# Patient Record
Sex: FEMALE | Race: WHITE
[De-identification: names, ages, dates, MRNs, and addresses within clinical notes are randomized per-mention and may not be internally consistent; named-entity substitution may affect disease eponyms.]

---

## 2017-01-03 ENCOUNTER — HOSPITAL ENCOUNTER (OUTPATIENT)
Dept: HOSPITAL 80 - BMCIMAGING | Age: 72
End: 2017-01-03
Attending: GENERAL ACUTE CARE HOSPITAL
Payer: COMMERCIAL

## 2017-01-03 DIAGNOSIS — I73.9: ICD-10-CM

## 2017-01-03 DIAGNOSIS — Z12.31: Primary | ICD-10-CM

## 2017-01-03 PROCEDURE — G0202 SCR MAMMO BI INCL CAD: HCPCS

## 2017-01-03 NOTE — MA
Screening Digital Mammogram

 

Clinical Indications: Routine screening.  

 

Technique:  Standard cephalocaudal and mediolateral oblique projections are obtained.  This examinati
on is processed by the Gek[employer] computer aided detection system. 

 

Comparison: December 2015, December 2014, November 2013, October 2012 , October 2011 and October 2009


 

Breast density: B; There are scattered fibroglandular densities.

 

Findings: CAD was reviewed.  No suspicious findings are identified.

 

Impression: Negative mammogram. . 

 

BI-RADS 1.

 

Recommendation:   Routine screening is recommended in one year.

 

[facility] will send a result letter to the patient.

 

Negative mammography should not preclude additional workup of a clinically suspicious finding.

 

The patient's information is entered into a reminder system with a target due date for her next mammo
gram.

## 2017-01-03 NOTE — US
Ultrasound arterial duplex aortoiliac and right lower extremity runoff

 

Indication:  Peripheral vascular disease.

 

Comparison: 

September 16, 2015 arterial duplex, 

CT angiogram from December 17, 2015.

 

Technique: Grayscale color and duplex imaging of the aortoiliac system and right lower extremity.

 

Findings:  

Velocities in the aorta vary between 73 and 114 cm per second. The common iliac origin has a moderate
 stenosis with elevation of velocity to 226 cm per second. This 2 to 1 peak systolic velocity increas
e is consistent with a 50% stenosis that is consistent with the area stenosis on the CTA as calculate
d by TeraRecon calculations. The waveforms below the 50% stenosis remain multiphasic throughout the i
liac system. External iliac velocity is 80 cm per second. 

 

There is multiphasic waveforms with normal velocities in the common femoral, profunda femoral, superf
icial femoral and popliteal artery. Velocities between the common femoral and popliteal artery vary b
etween 54 and 164 cm per second. Mild diffuse atherosclerotic disease is present without evidence of 
a focal high grade stenosis.

 

Below knee there is multiphasic waveforms in the anterior, tibial, peroneal, and posterior tibial art
eries with velocities varying between 60 to 91 cm per second.

 

Impression:  Approximately 50% stenosis at the iliac origin. Previous ultrasound over estimated the s
tenosis and the stenosis was approximately 50% by volume on the CTA when calculated using TeraRecon i
maging.

## 2017-03-06 ENCOUNTER — HOSPITAL ENCOUNTER (OUTPATIENT)
Dept: HOSPITAL 80 - BMCIMAGING | Age: 72
End: 2017-03-06
Attending: INTERNAL MEDICINE
Payer: COMMERCIAL

## 2017-03-06 DIAGNOSIS — Z13.820: Primary | ICD-10-CM

## 2017-08-14 ENCOUNTER — HOSPITAL ENCOUNTER (OUTPATIENT)
Dept: HOSPITAL 80 - FIMAGING | Age: 72
End: 2017-08-14
Attending: PHYSICIAN ASSISTANT
Payer: COMMERCIAL

## 2017-08-14 DIAGNOSIS — Z01.818: Primary | ICD-10-CM

## 2017-08-14 DIAGNOSIS — M17.11: ICD-10-CM

## 2017-08-14 DIAGNOSIS — Z96.643: ICD-10-CM

## 2017-08-23 ENCOUNTER — HOSPITAL ENCOUNTER (OUTPATIENT)
Dept: HOSPITAL 80 - FCATH | Age: 72
Setting detail: OBSERVATION
LOS: 1 days | Discharge: HOME | End: 2017-08-24
Attending: INTERNAL MEDICINE | Admitting: INTERNAL MEDICINE
Payer: COMMERCIAL

## 2017-08-23 DIAGNOSIS — I25.119: ICD-10-CM

## 2017-08-23 DIAGNOSIS — T82.857A: Primary | ICD-10-CM

## 2017-08-23 DIAGNOSIS — E78.5: ICD-10-CM

## 2017-08-23 DIAGNOSIS — I10: ICD-10-CM

## 2017-08-23 DIAGNOSIS — I73.9: ICD-10-CM

## 2017-08-23 LAB
% IMMATURE GRANULYOCYTES: 0.5 % (ref 0–1.1)
ABSOLUTE IMMATURE GRANULOCYTES: 0.03 10^3/UL (ref 0–0.1)
ABSOLUTE NRBC COUNT: 0 10^3/UL (ref 0–0.01)
ADD DIFF?: NO
ADD MORPH?: NO
ADD SCAN?: NO
ANION GAP SERPL CALC-SCNC: 11 MEQ/L (ref 8–16)
ATYPICAL LYMPHOCYTE FLAG: 20 (ref 0–99)
CALCIUM SERPL-MCNC: 9.2 MG/DL (ref 8.5–10.4)
CHLORIDE SERPL-SCNC: 104 MEQ/L (ref 97–110)
CHOLEST SERPL-MCNC: 147 MG/DL (ref 140–220)
CHOLEST/HDLC SERPL: 1.93 RATIO (ref 1–4.44)
CO2 SERPL-SCNC: 21 MEQ/L (ref 22–31)
CREAT SERPL-MCNC: 0.6 MG/DL (ref 0.6–1)
ERYTHROCYTE [DISTWIDTH] IN BLOOD BY AUTOMATED COUNT: 11.5 % (ref 11.5–15.2)
FRAGMENT RBC FLAG: 0 (ref 0–99)
GFR SERPL CREATININE-BSD FRML MDRD: > 60 ML/MIN/{1.73_M2}
GLUCOSE SERPL-MCNC: 94 MG/DL (ref 70–100)
HCT VFR BLD CALC: 39.4 % (ref 38–47)
HGB BLD-MCNC: 13.9 G/DL (ref 12.6–16.3)
HIGH DENSITY LIPOPROTEIN: 76 MG/DL (ref 40–85)
INR PPP: 1.02 (ref 0.83–1.16)
LDLC/HDLC SERPL: 0.75 RATIO (ref 1–3.22)
LEFT SHIFT FLG: 0 (ref 0–99)
LIPEMIA HEMOLYSIS FLAG: 90 (ref 0–99)
LOW DENSITY LIPOPROTEIN: 57 MG/DL (ref 80–100)
MAGNESIUM SERPL-MCNC: 2.1 MG/DL (ref 1.6–2.3)
MCH RBC BLDCO QN: 34.3 PG (ref 27.9–34.1)
MCHC RBC AUTO-ENTMCNC: 35.3 G/DL (ref 32.4–36.7)
MCV RBC AUTO: 97.3 FL (ref 81.5–99.8)
NON-HIGH DENSITY LIPOPROTEIN: 71 MG/DL (ref 90–129)
NRBC-AUTO%: 0 % (ref 0–0.2)
PLATELET # BLD: 251 10^3/UL (ref 150–400)
PLATELET CLUMPS FLAG: 0 (ref 0–99)
PMV BLD AUTO: 9.4 FL (ref 8.7–11.7)
POTASSIUM SERPL-SCNC: 3.8 MEQ/L (ref 3.5–5.2)
PROTHROMBIN TIME: 13.3 SEC (ref 12–15)
RBC # BLD AUTO: 4.05 10^6/UL (ref 4.18–5.33)
SODIUM SERPL-SCNC: 136 MEQ/L (ref 134–144)
TRIGL SERPL-MCNC: 72 MG/DL (ref 35–135)
VERY LOW DENSITY LIPOPROTEINS: 14 MG/DL (ref 8–25)

## 2017-08-23 PROCEDURE — 93308 TTE F-UP OR LMTD: CPT

## 2017-08-23 PROCEDURE — 4A023N7 MEASUREMENT OF CARDIAC SAMPLING AND PRESSURE, LEFT HEART, PERCUTANEOUS APPROACH: ICD-10-PCS | Performed by: INTERNAL MEDICINE

## 2017-08-23 PROCEDURE — B2151ZZ FLUOROSCOPY OF LEFT HEART USING LOW OSMOLAR CONTRAST: ICD-10-PCS | Performed by: INTERNAL MEDICINE

## 2017-08-23 PROCEDURE — 93454 CORONARY ARTERY ANGIO S&I: CPT

## 2017-08-23 PROCEDURE — G0378 HOSPITAL OBSERVATION PER HR: HCPCS

## 2017-08-23 PROCEDURE — C1887 CATHETER, GUIDING: HCPCS

## 2017-08-23 PROCEDURE — C1769 GUIDE WIRE: HCPCS

## 2017-08-23 PROCEDURE — B2111ZZ FLUOROSCOPY OF MULTIPLE CORONARY ARTERIES USING LOW OSMOLAR CONTRAST: ICD-10-PCS | Performed by: INTERNAL MEDICINE

## 2017-08-23 PROCEDURE — 93005 ELECTROCARDIOGRAM TRACING: CPT

## 2017-08-23 PROCEDURE — C1725 CATH, TRANSLUMIN NON-LASER: HCPCS

## 2017-08-23 NOTE — CPEKG
Heart Rate: 60

RR Interval: 1000

P-R Interval: 188

QRSD Interval: 96

QT Interval: 448

QTC Interval: 448

P Axis: 36

QRS Axis: 7

T Wave Axis: 45

EKG Severity - NORMAL ECG -

EKG Impression: SINUS RHYTHM

Electronically Signed By: Pranav Ching 23-Aug-2017 15:29:06

## 2017-08-23 NOTE — CPIP
[f rep st]



                                                       INVASIVE CARDIAC PROCEDURE





DATE OF PROCEDURE:  08/23/2017



PROCEDURE PERFORMED:  Diagnostic left heart catheterization.



INDICATION FOR PROCEDURE:  Known history of coronary artery disease.  New high-risk findings on exer
cise nuclear stress test.  The patient had 1.5 mm ST-segment depression with exercise.  Talavera treadmi
ll score of -1.5, placing her at moderate risk.  Nuclear imaging demonstrated a large, severe intens
ity reversible defect involving the apical inferior, mid inferior, mid inferolateral, basal inferior
 wall consistent with ischemia.  There was also evidence of transient ischemic dilatation.  These fi
ndings were new compared to previous study done in February 2016.



DESCRIPTION OF PROCEDURE:  After informed consent was obtained, the patient was brought to the [facility] catheterization lab where she was prepped and draped in a sterile fashion.  Prior to considering 
cannulation of the right common femoral artery, images were reviewed from her peripheral interventio
n in 2016.  Images from her diagnostic left heart catheterization were also reviewed in the setting 
of an anomalous circumflex coronary artery.  Based on the images of her peripheral vascular work las
t December, I felt that access through the right common femoral artery would be successful.  Using t
he modified Seldinger technique, a 6-Slovak catheter was placed in the right common femoral artery w
ithout complications.  A JL4 catheter was used to cannulate the left anterior descending coronary ar
dionicio.  Images of the left anterior descending were obtained in multiple projections.  The JL4 cathet
er was exchanged over a guidewire for a JR4 catheter.  JR4 catheter was used to cannulate both the r
ight coronary artery as well as the anomalous circumflex, which shared a common ostium with the RCA.
  Images of both the circumflex and right coronary artery were obtained in multiple projections.



FINDINGS:  

1.  Left anterior descending artery:  Demonstrates some mild luminal irregularities.  No flow-limiti
ng coronary artery disease.

2.  Circumflex:  There is a patent ostial circumflex stent with anomalous takeoff of the circumflex 
arising off the ostium of the RCA.  There are some mild luminal irregularities remaining in the circ
umflex with no evidence of hemodynamically significant stenosis.

3.  The right coronary artery is a large caliber dominant vessel that bifurcates into PDA and PLV.  
There is a 90% stenosis of the proximal right coronary artery.  There is decreased flow throughout t
he remainder of the stented segment of the vessel which is all the way down through the PDA.  There 
is collateral flow from the LAD to the PLV.

4.  Attempts to cross the aortic valve with pigtail catheter were unsuccessful.



CONCLUSIONS:  Severe single-vessel disease in a large caliber dominant right coronary artery.  I hav
e reviewed these images with my interventional colleague, Dr. Aranda.  We will plan for PCI to the p
roximal RCA and reassess the remainder of the vessel after initial percutaneous coronary interventio
n.  The patient confirmed to me that she had returned to Plavix 75 mg daily in conjunction with aspi
rin 81 mg daily.





Job #:  [id number]

## 2017-08-23 NOTE — CPEKG
Heart Rate: 56

RR Interval: 1071

P-R Interval: 204

QRSD Interval: 92

QT Interval: 484

QTC Interval: 468

P Axis: 20

QRS Axis: 18

T Wave Axis: 34

EKG Severity - NORMAL ECG -

EKG Impression: SINUS RHYTHM

Electronically Signed By: Pranav Ching 24-Aug-2017 15:55:19

## 2017-08-23 NOTE — ECHO
[id number]

[id number]



+---------------------------------------------------+      [address]

:                                                   :       [place] [address]

:                                                   :           [phone]

+---------------------------------------------------+       Fax [phone]



                       Adult Echocardiographic Report



+---------------------------------------------------------------------------+

:Name: LUIS ESPINOZA       Study Date: 2017 05:04 PM               :

:MRN: [id number]              Hospital Admission Number: [id number]       :

:: 1945              Gender: Female                                :

:Age: 72 yrs                  Race: WH                                      :

:Reason For Study: Eval for Pericardial Effusion                            :

:History: Cath                                                              :

+---------------------------------------------------------------------------+





Pericardium/Pleural

There is no pericardial effusion.





Conclusion

This is a limited echo x 2 to evaluate left ventricular systolic function

and pericardial space.

1. This is a limited echo x 2 to evaluate left ventricular systolic function

and pericardial space.

2. The left ventricle is normal in size and function.

3. There is no pericardial effusion.



_____________________________________________________________________________







Final Reading Physician:

                        Man Aranda MD  electronically signed on 2017

                        05:23 PM

Ordering Physician: Franky Singh

Performed By: Law Mccord, CS

## 2017-08-24 VITALS
TEMPERATURE: 97.5 F | DIASTOLIC BLOOD PRESSURE: 63 MMHG | SYSTOLIC BLOOD PRESSURE: 124 MMHG | RESPIRATION RATE: 14 BRPM | HEART RATE: 59 BPM | OXYGEN SATURATION: 94 %

## 2017-08-24 LAB
% IMMATURE GRANULYOCYTES: 0.3 % (ref 0–1.1)
ABSOLUTE IMMATURE GRANULOCYTES: 0.02 10^3/UL (ref 0–0.1)
ABSOLUTE NRBC COUNT: 0 10^3/UL (ref 0–0.01)
ADD DIFF?: NO
ADD MORPH?: NO
ADD SCAN?: NO
ALBUMIN SERPL-MCNC: 3.6 G/DL (ref 3.5–5)
ALP SERPL-CCNC: 64 IU/L (ref 38–126)
ALT SERPL-CCNC: 27 IU/L (ref 9–52)
ANION GAP SERPL CALC-SCNC: 11 MEQ/L (ref 8–16)
AST SERPL-CCNC: 22 IU/L (ref 14–46)
ATYPICAL LYMPHOCYTE FLAG: 10 (ref 0–99)
BILIRUB SERPL-MCNC: 1.1 MG/DL (ref 0.1–1.4)
CALCIUM SERPL-MCNC: 8.7 MG/DL (ref 8.5–10.4)
CHLORIDE SERPL-SCNC: 103 MEQ/L (ref 97–110)
CO2 SERPL-SCNC: 22 MEQ/L (ref 22–31)
CREAT SERPL-MCNC: 0.5 MG/DL (ref 0.6–1)
ERYTHROCYTE [DISTWIDTH] IN BLOOD BY AUTOMATED COUNT: 11.5 % (ref 11.5–15.2)
FRAGMENT RBC FLAG: 0 (ref 0–99)
GFR SERPL CREATININE-BSD FRML MDRD: > 60 ML/MIN/{1.73_M2}
GLUCOSE SERPL-MCNC: 79 MG/DL (ref 70–100)
HCT VFR BLD CALC: 36.7 % (ref 38–47)
HGB BLD-MCNC: 12.8 G/DL (ref 12.6–16.3)
LEFT SHIFT FLG: 0 (ref 0–99)
LIPEMIA HEMOLYSIS FLAG: 90 (ref 0–99)
MCH RBC BLDCO QN: 34.1 PG (ref 27.9–34.1)
MCHC RBC AUTO-ENTMCNC: 34.9 G/DL (ref 32.4–36.7)
MCV RBC AUTO: 97.9 FL (ref 81.5–99.8)
NRBC-AUTO%: 0 % (ref 0–0.2)
PLATELET # BLD: 223 10^3/UL (ref 150–400)
PLATELET CLUMPS FLAG: 0 (ref 0–99)
PMV BLD AUTO: 9.7 FL (ref 8.7–11.7)
POTASSIUM SERPL-SCNC: 3.7 MEQ/L (ref 3.5–5.2)
PROT SERPL-MCNC: 6.1 G/DL (ref 6.3–8.2)
RBC # BLD AUTO: 3.75 10^6/UL (ref 4.18–5.33)
SODIUM SERPL-SCNC: 136 MEQ/L (ref 134–144)

## 2017-08-24 NOTE — CPEKG
Heart Rate: 64

RR Interval: 938

P-R Interval: 220

QRSD Interval: 96

QT Interval: 440

QTC Interval: 454

P Axis: 30

QRS Axis: 10

T Wave Axis: 18

EKG Severity - ABNORMAL ECG -

EKG Impression: SINUS RHYTHM

EKG Impression: FIRST DEGREE AV BLOCK

Electronically Signed By: Pranav Ching 24-Aug-2017 15:55:06

## 2017-08-24 NOTE — GDS
[f rep st]



                                                             DISCHARGE SUMMARY





DISCHARGE DIAGNOSES:  

1.  Coronary artery disease:  Patient has known coronary artery disease and is status post previous 
percutaneous coronary intervention of her right coronary artery and circumflex coronary artery.  She
 presented with symptoms of class 2 angina and a high risk abnormal nuclear stress test.  She was ta
zaira to the cardiac catheterization laboratory for risk stratification where she was found to have si
ngle-vessel coronary artery disease involving the right coronary artery.  Patient had an approximate
ly 75% stenosis in the proximal segment of the right coronary artery, representing in-stent restenos
is.  Patient also had a 95% stenosis in the distal right coronary artery, also representing in-stent
 restenosis.  Percutaneous coronary intervention of the right coronary artery was attempted but was 
halted secondary to sub intimal dissection with inability to re-enter the right coronary artery.  Th
e patient was admitted to the hospital for observation overnight.  Her postprocedural course was unc
omplicated.  She denied symptoms of chest pain.  Followup echocardiogram demonstrated no evidence of
 pericardial effusion or segmental wall motion abnormality.  Patient will be presented at [facility] for revascularization and management strategies.

2.  Hypertension:  Patient has a long history of hypertension.  Her blood pressure has been well con
trolled on metoprolol and valsartan.  Patient will be continued on these medications.

3.  Hyperlipidemia:  Patient has a long history of hyperlipidemia.  Her LDL cholesterol is 57, on Li
pitor 40 mg daily.  Patient will be continued on her current medications.



SUMMARY OF PRESENTATION AND COURSE:  The patient is a 72-year-old female with known coronary artery 
disease, status post previous stenting of her right coronary artery and circumflex coronary artery. 
 She presented in the outpatient setting with symptoms of class II angina.  Stress testing was notab
le for a large inferolateral perfusion defect.  She was taken to the cardiac catheterization [facility] in 08/23/2017 for further risk stratification.  Coronary angiography demonstrated single-vessel 
coronary artery disease involving her right coronary artery.  She was found to have a high-grade pro
ximal stenosis of approximately 75%, as well as a high-grade distal stenosis of approximately 95%; b
oth of these lesions represented in-stent restenosis of the previously placed stents.  Percutaneous 
coronary intervention of the right coronary artery was attempted but was halted secondary to subinti
mal dissection in the distal vessel with failure of re-entry into the true lumen.  Patient was admit
jd to the hospital overnight for observation.  Her postprocedural course was uncomplicated.  Feli kahn denied symptoms of angina throughout her hospitalization.  Echocardiogram demonstrated no evidence
 of pericardial effusion, and at the time of discharge, patient was ambulating without difficulty.



DISCHARGE MEDICATIONS:  Please see medicine reconciliation form.



PHYSICAL EXAMINATION AT DISCHARGE:  GENERAL:  Patient is resting comfortably in bed.  She did not ap
pear to be in acute distress.  VITAL SIGNS:  Temperature afebrile, pulse 66, blood pressure 112/71, 
respiratory rate 18, SaO2 95% on room air.  LUNGS:  Clear to auscultation bilaterally.  CARDIOVASCUL
AR:  Regular rate and rhythm.  S1, S2.  No murmurs, rubs, or gallops appreciated.  ABDOMEN:  Soft, n
ontender.  Normoactive bowel sounds.  EXTREMITIES:  No clubbing, cyanosis, or edema.  Right inguinal
 access site with no hematoma or ecchymosis.  2+ dorsalis pedis pulse.



LABORATORY AT DISCHARGE:  Sodium 136, potassium 3.7, chloride 103, CO2 22, BUN 13, creatinine 0.5.  
White blood cell count 5.84, hemoglobin 12.8, hematocrit 36.7, platelet count 223.



ARRANGEMENTS FOR FOLLOWUP CARE:  

1.  Patient will follow up with Dr. Singh of [facility] in approximately 1-2 weeks' perio
d of time.

2.  Patient will be presented at case conference for revascularization strategies as well as medical
 management strategies.



INSTRUCTIONS TO PATIENT:  

1.  Standard groin precautions given.

2.  Patient to return to the hospital for crescendo anginal symptoms.





Job #:  [id number]

## 2017-08-24 NOTE — ECHO
[id number]

[id number]



+---------------------------------------------------+      [address]

:                                                   :       [place] [address]

:                                                   :           [phone]

+---------------------------------------------------+       Fax [phone]



                       Adult Echocardiographic Report



+----------------------------------------------------------------------------

------+

:Name: LUIS ESPINOZA KStudy Date: 2017 12:24 PM                       

      :

:MRN: [id number]       Hospital Admission Number: [id number] Locatio

n: 204:

:: 1945       Gender: Female                         Height: 63 in  

      :

:Age: 72 yrs           Race: WH                               Weight: 154 lb 

      :

:Reason For Study: eval for pericardial effusion                             

      :

:                                                             BSA: 1.7 meters

2     :

:History: CATH                                                               

      :

+----------------------------------------------------------------------------

------+





Pericardium/Pleural

No significant pericardial effusion noted.



Conclusion

Limited echocardiogram to reassess for pericardial effusion.

No pericardial effusion present.





_____________________________________________________________________________





Final Reading Physician:

                        Adore Goldstein signed on 2017 02:29 PM

Ordering Physician: Franky Singh

Performed By: Lucy Morillo

## 2017-11-10 ENCOUNTER — HOSPITAL ENCOUNTER (INPATIENT)
Dept: HOSPITAL 80 - F2W | Age: 72
LOS: 4 days | Discharge: HOME | DRG: 236 | End: 2017-11-14
Attending: THORACIC SURGERY (CARDIOTHORACIC VASCULAR SURGERY) | Admitting: THORACIC SURGERY (CARDIOTHORACIC VASCULAR SURGERY)
Payer: COMMERCIAL

## 2017-11-10 DIAGNOSIS — I10: ICD-10-CM

## 2017-11-10 DIAGNOSIS — Z95.5: ICD-10-CM

## 2017-11-10 DIAGNOSIS — D62: ICD-10-CM

## 2017-11-10 DIAGNOSIS — I25.10: Primary | ICD-10-CM

## 2017-11-10 DIAGNOSIS — I73.9: ICD-10-CM

## 2017-11-10 DIAGNOSIS — Z96.643: ICD-10-CM

## 2017-11-10 LAB
CALCULATED OXYGEN SATURATION: 98 % (ref 92–95)
SAMPLE TYPE: (no result)

## 2017-11-10 PROCEDURE — 5A1221Z PERFORMANCE OF CARDIAC OUTPUT, CONTINUOUS: ICD-10-PCS | Performed by: THORACIC SURGERY (CARDIOTHORACIC VASCULAR SURGERY)

## 2017-11-10 PROCEDURE — P9041 ALBUMIN (HUMAN),5%, 50ML: HCPCS

## 2017-11-10 PROCEDURE — 06BQ4ZZ EXCISION OF LEFT SAPHENOUS VEIN, PERCUTANEOUS ENDOSCOPIC APPROACH: ICD-10-PCS | Performed by: THORACIC SURGERY (CARDIOTHORACIC VASCULAR SURGERY)

## 2017-11-10 PROCEDURE — 30233N1 TRANSFUSION OF NONAUTOLOGOUS RED BLOOD CELLS INTO PERIPHERAL VEIN, PERCUTANEOUS APPROACH: ICD-10-PCS | Performed by: THORACIC SURGERY (CARDIOTHORACIC VASCULAR SURGERY)

## 2017-11-10 PROCEDURE — 02100Z9 BYPASS CORONARY ARTERY, ONE ARTERY FROM LEFT INTERNAL MAMMARY, OPEN APPROACH: ICD-10-PCS | Performed by: THORACIC SURGERY (CARDIOTHORACIC VASCULAR SURGERY)

## 2017-11-10 PROCEDURE — 021109W BYPASS CORONARY ARTERY, TWO ARTERIES FROM AORTA WITH AUTOLOGOUS VENOUS TISSUE, OPEN APPROACH: ICD-10-PCS | Performed by: THORACIC SURGERY (CARDIOTHORACIC VASCULAR SURGERY)

## 2017-11-10 PROCEDURE — 02L70CK OCCLUSION OF LEFT ATRIAL APPENDAGE WITH EXTRALUMINAL DEVICE, OPEN APPROACH: ICD-10-PCS | Performed by: THORACIC SURGERY (CARDIOTHORACIC VASCULAR SURGERY)

## 2017-11-10 RX ADMIN — ALBUMIN (HUMAN) PRN MLS: 2.5 SOLUTION INTRAVENOUS at 11:28

## 2017-11-10 RX ADMIN — Medication PRN MCG: at 14:00

## 2017-11-10 RX ADMIN — ALBUMIN (HUMAN) PRN MLS: 2.5 SOLUTION INTRAVENOUS at 14:10

## 2017-11-10 RX ADMIN — POTASSIUM CHLORIDE SCH MLS: 200 INJECTION, SOLUTION INTRAVENOUS at 18:23

## 2017-11-10 RX ADMIN — POTASSIUM CHLORIDE SCH MLS: 200 INJECTION, SOLUTION INTRAVENOUS at 15:30

## 2017-11-10 RX ADMIN — ALBUMIN (HUMAN) PRN MLS: 2.5 SOLUTION INTRAVENOUS at 13:24

## 2017-11-10 RX ADMIN — HYDROCODONE BITARTRATE AND ACETAMINOPHEN PRN TAB: 5; 325 TABLET ORAL at 19:08

## 2017-11-10 RX ADMIN — Medication PRN MCG: at 11:27

## 2017-11-10 RX ADMIN — DOCUSATE SODIUM AND SENNOSIDES SCH: 50; 8.6 TABLET ORAL at 20:19

## 2017-11-10 RX ADMIN — Medication PRN MCG: at 20:02

## 2017-11-10 RX ADMIN — Medication SCH MLS: at 21:50

## 2017-11-10 RX ADMIN — MUPIROCIN SCH APP: 20 OINTMENT TOPICAL at 21:50

## 2017-11-10 RX ADMIN — Medication PRN MCG: at 23:14

## 2017-11-10 RX ADMIN — Medication SCH MLS: at 14:25

## 2017-11-10 NOTE — PDANEPAE
ANE History of Present Illness





cab





ANE Past Medical History





- Cardiovascular History


Hx Hypertension: Yes


Hx Arrhythmias: No


Hx Chest Pain: No


Hx Coronary Artery / Peripheral Vascular Disease: Yes


Hx CHF / Valvular Disease: No


Hx Palpitations: No


Cardiovascular History Comment: STENTS PLACED X9 LAST 10/25/2017





- Pulmonary History


Hx COPD: No


Hx Asthma/Reactive Airway Disease: No


Hx Recent Upper Respiratory Infection: No


Hx Oxygen in Use at Home: No


Hx Sleep Apnea: No


Sleep Apnea Screening Result - Last Documented: Negative





- Neurologic History


Hx Cerebrovascular Accident: No


Hx Seizures: No


Hx Dementia: No





- Endocrine History


Hx Diabetes: No





- Renal History


Hx Renal Disorders: Yes


Renal History Comment: OVERACTIVE BLADDER





- Liver History


Hx Hepatic Disorders: No





- Neurological & Psychiatric Hx


Hx Neurological and Psychiatric Disorders: No





- Cancer History


Hx Cancer: No





- Congenital Disorder History


Hx Congenital Disorders: No





- GI History


Hx Gastrointestinal Disorders: No





- Other Health History


Other Health History: OSTEOARTHRITIS BAD KNEE'S.  HEREDITARY HEMOCHROMATOSIS.  

BRUISES EASILY





- Chronic Pain History


Chronic Pain: Yes (KNEE'S)





- Surgical History


Prior Surgeries: HEART STENT 10/25/2017.  JUWAN TOTAL HIP.  SHLDR RTC.  

TONSILLECTOMY





ANE Review of Systems


Review of Systems: 








- Exercise capacity


METS (RN): 4 METS





ANE Patient History





- Allergies


Allergies/Adverse Reactions: 








amoxicillin [Amoxicillin] Allergy (Severe, Verified 08/21/17 11:27)


 Rash


ampicillin [Ampicillin] Allergy (Severe, Verified 08/21/17 11:27)


 Rash


Penicillins Allergy (Severe, Verified 11/09/17 17:47)


 Rash


amlodipine besylate [From Norvasc] Allergy (Mild, Verified 11/09/17 17:34)


 Other-Enter Comments


felodipine [Felodipine] Allergy (Mild, Verified 11/09/17 17:34)


 Other-Enter Comments








- Home Medications


Home Medications: 








Aspirin [Aspirin 81mg (*)] 81 mg PO HS 08/04/14 [Last Taken 11/09/17 21:30]


Atorvastatin Calcium [Lipitor 40 mg (*)] 40 mg PO HS 06/22/16 [Last Taken 11/09/ 17 21:30]


Cholecalciferol Vit D3 [Vitamin D3] 400 units PO DAILY 11/09/17 [Last Taken 11/ 09/17 06:30]


Herbals/Supplements -Info Only 1 ea PO DAILY 11/09/17 [Last Taken 11/09/17 06:30

]


amLODIPine/VALSARTAN [Amlodipine-Valsartan  mg] 1 each PO DAILY 11/09/17 [

Last Taken 11/09/17 06:30]








- NPO status


NPO Since - Liquids (Date): 11/09/17


NPO Since - Liquids (Time): 23:50


NPO Since - Solids (Date): 11/09/17


NPO Since - Solids (Time): 18:00





- Anes Hx


Anes Hx: no prior problems





- Smoking Hx


Smoking Status: Former smoker





ANE Labs/Vital Signs





- Vital Signs


Blood Pressure: 129/73


Heart Rate: 66


Respiratory Rate: 16


O2 Sat (%): 96


Height: 161.29 cm


Weight: 68.039 kg





ANE Physical Exam





- Airway


Mallampati Score: Class 2


Mouth exam: normal dental/mouth exam





- Pulmonary


Pulmonary: no respiratory distress





- Cardiovascular


Cardiovascular: regular rate and rhythym





- ASA Status


ASA Status: III





ANE Anesthesia Plan


Anesthesia Plan: general endotracheal anesthesia


Lines/Monitors: arterial line, central line, JENNYFER

## 2017-11-10 NOTE — CPEKG
Heart Rate: 68

RR Interval: 882

P-R Interval: 216

QRSD Interval: 88

QT Interval: 468

QTC Interval: 498

P Axis: 64

QRS Axis: 1

T Wave Axis: 26

EKG Severity - BORDERLINE ECG -

EKG Impression: SINUS RHYTHM

EKG Impression: BORDERLINE PROLONGED QT INTERVAL

Electronically Signed By: Zane Vallejo 10-Nov-2017 15:53:30

## 2017-11-10 NOTE — GOP
[f rep st]



                                                                OPERATIVE REPORT





DATE OF OPERATION:  11/10/2017



SURGEON:  Jorge Welch DO



ASSISTANT:  Ananth Sapp PA-C.



ANESTHESIA:  Tej Zee MD.



PREOPERATIVE DIAGNOSIS:  Arteriosclerotic heart disease.



POSTOPERATIVE DIAGNOSIS:  Arteriosclerotic heart disease.



PROCEDURE PERFORMED:  

1.  Coronary artery bypass grafting x3 with left internal mammary artery to the mid left anterior sheela
cending, saphenous vein graft to the patent ductus arteriosus, sequential posterior lateral right.

2.  AtriClip to the left atrial appendage.

3.  Endoscopic vein harvest of the left thigh.



FINDINGS:  





DESCRIPTION OF PROCEDURE:  Patient was consented for surgery, brought to the operating room, intubate
d, and monitoring lines were placed.  She was prepped and draped in sterile classical manner.  Sterno
deneen was performed.  She was noted to be severely osteoporotic.  Mammary was harvested; it was a 2 mm
 vessel with brisk flow.  She was heparinized.  Cannulated ascending aorta and right atrium in standa
rd fashion without difficulty.  Cardiopulmonary bypass was begun.  A cardioplegic arrest was obtained
 with antegrade cardioplegia, topical hypothermia, and systemic cooling.  



Initially, a 35 mm AtriClip was applied to the left atrial appendage flush with the left atrial wall.
  We then proceeded with grafting the posterolateral branch of the right, which was heavily stented o
ut to its midportion.  It was a 1.6-1.8 mm vessel and was grafted end-to-side with the vein graft.  T
his was then brought off the PDA side-to-side, which was a 1.6 mm vessel in its proximal portion, and
 fusion flow was good without resistance.  It was then anastomosed to the ascending aorta.  Rewarming
 was begun while the mammary was grafted to a poor quality LAD, which, upon opening in the midportion
, appeared to have a healed dissection.  There was circumferential plaque throughout most of the vess
el.  The mammary was grafted.  It was probed patent.  It was tacked to the epicardium.  Cross-clamp w
as removed with suction on the ascending aortic vent.  It should be noted that vein was harvested fro
m the left thigh by TRACEE Ornelas, who first assisted throughout the procedure.  Spontaneous cardia
c activity was noted to resume.  The patient was rewarmed and weaned from bypass.  Heparin was revers
ed with protamine.  Cannula was removed and oversewn.  Two ventricular pacing wires, one left pleural
, and one mediastinal drains were placed.  Thymic fat and pericardium were closed.  Chest was closed 
in standard fashion.  The patient was returned to ICU in stable condition.





Job #:  [id number]

## 2017-11-11 LAB
% IMMATURE GRANULYOCYTES: 0.5 % (ref 0–1.1)
ABSOLUTE IMMATURE GRANULOCYTES: 0.06 10^3/UL (ref 0–0.1)
ABSOLUTE NRBC COUNT: 0 10^3/UL (ref 0–0.01)
ADD DIFF?: NO
ADD MORPH?: NO
ADD SCAN?: NO
ANION GAP SERPL CALC-SCNC: 9 MEQ/L (ref 8–16)
ATYPICAL LYMPHOCYTE FLAG: 0 (ref 0–99)
CALCIUM SERPL-MCNC: 8 MG/DL (ref 8.5–10.4)
CHLORIDE SERPL-SCNC: 105 MEQ/L (ref 97–110)
CO2 SERPL-SCNC: 23 MEQ/L (ref 22–31)
CREAT SERPL-MCNC: 0.6 MG/DL (ref 0.6–1)
ERYTHROCYTE [DISTWIDTH] IN BLOOD BY AUTOMATED COUNT: 13.1 % (ref 11.5–15.2)
ERYTHROCYTE [DISTWIDTH] IN BLOOD BY AUTOMATED COUNT: 13.2 % (ref 11.5–15.2)
FRAGMENT RBC FLAG: 0 (ref 0–99)
GFR SERPL CREATININE-BSD FRML MDRD: > 60 ML/MIN/{1.73_M2}
GLUCOSE SERPL-MCNC: 118 MG/DL (ref 70–100)
HCT VFR BLD CALC: 23.2 % (ref 38–47)
HCT VFR BLD CALC: 26.3 % (ref 38–47)
HGB BLD-MCNC: 8.3 G/DL (ref 12.6–16.3)
HGB BLD-MCNC: 8.8 G/DL (ref 12.6–16.3)
LEFT SHIFT FLG: 0 (ref 0–99)
LIPEMIA HEMOLYSIS FLAG: 80 (ref 0–99)
LIPEMIA HEMOLYSIS FLAG: 90 (ref 0–99)
MCH RBC BLDCO QN: 34.2 PG (ref 27.9–34.1)
MCH RBC BLDCO QN: 36.6 PG (ref 27.9–34.1)
MCHC RBC AUTO-ENTMCNC: 33.5 G/DL (ref 32.4–36.7)
MCHC RBC AUTO-ENTMCNC: 35.8 G/DL (ref 32.4–36.7)
MCV RBC AUTO: 102.2 FL (ref 81.5–99.8)
MCV RBC AUTO: 102.3 FL (ref 81.5–99.8)
NRBC-AUTO%: 0 % (ref 0–0.2)
PLATELET # BLD: 116 10^3/UL (ref 150–400)
PLATELET # BLD: 116 10^3/UL (ref 150–400)
PLATELET CLUMPS FLAG: 0 (ref 0–99)
PLATELET CLUMPS FLAG: 50 (ref 0–99)
PMV BLD AUTO: 10 FL (ref 8.7–11.7)
POTASSIUM SERPL-SCNC: 4.4 MEQ/L (ref 3.5–5.2)
POTASSIUM SERPL-SCNC: 4.5 MEQ/L (ref 3.5–5.2)
POTASSIUM SERPL-SCNC: 4.6 MEQ/L (ref 3.5–5.2)
RBC # BLD AUTO: 2.27 10^6/UL (ref 4.18–5.33)
RBC # BLD AUTO: 2.57 10^6/UL (ref 4.18–5.33)
SODIUM SERPL-SCNC: 137 MEQ/L (ref 134–144)

## 2017-11-11 RX ADMIN — PANTOPRAZOLE SODIUM SCH MG: 40 TABLET, DELAYED RELEASE ORAL at 09:43

## 2017-11-11 RX ADMIN — DOCUSATE SODIUM AND SENNOSIDES SCH TAB: 50; 8.6 TABLET ORAL at 20:24

## 2017-11-11 RX ADMIN — HYDROCODONE BITARTRATE AND ACETAMINOPHEN PRN TAB: 5; 325 TABLET ORAL at 15:21

## 2017-11-11 RX ADMIN — Medication SCH MLS: at 14:56

## 2017-11-11 RX ADMIN — MUPIROCIN SCH APP: 20 OINTMENT TOPICAL at 21:00

## 2017-11-11 RX ADMIN — HYDROCODONE BITARTRATE AND ACETAMINOPHEN PRN TAB: 5; 325 TABLET ORAL at 20:21

## 2017-11-11 RX ADMIN — HEPARIN SODIUM SCH UNIT: 5000 INJECTION, SOLUTION INTRAVENOUS; SUBCUTANEOUS at 14:56

## 2017-11-11 RX ADMIN — Medication SCH MLS: at 05:26

## 2017-11-11 RX ADMIN — Medication SCH MLS: at 21:32

## 2017-11-11 RX ADMIN — HEPARIN SODIUM SCH UNIT: 5000 INJECTION, SOLUTION INTRAVENOUS; SUBCUTANEOUS at 05:27

## 2017-11-11 RX ADMIN — ASPIRIN 81 MG SCH MG: 81 TABLET ORAL at 20:23

## 2017-11-11 RX ADMIN — HEPARIN SODIUM SCH UNIT: 5000 INJECTION, SOLUTION INTRAVENOUS; SUBCUTANEOUS at 21:34

## 2017-11-11 RX ADMIN — HYDROCODONE BITARTRATE AND ACETAMINOPHEN PRN TAB: 5; 325 TABLET ORAL at 00:12

## 2017-11-11 RX ADMIN — HYDROCODONE BITARTRATE AND ACETAMINOPHEN PRN TAB: 5; 325 TABLET ORAL at 10:45

## 2017-11-11 RX ADMIN — DOCUSATE SODIUM AND SENNOSIDES SCH: 50; 8.6 TABLET ORAL at 09:44

## 2017-11-11 RX ADMIN — MUPIROCIN SCH APP: 20 OINTMENT TOPICAL at 09:43

## 2017-11-11 NOTE — ASMTCASEMG
Living Arrangements

 

What is your living           Answers:  With Spouse                           

arrangement? Who do you                                                       

live with?                                                                    

Type Of Residence

 

What kind of residence do     Answers:  House                                 

you live in?                                                                  

Discharge Plan Comments

 

Coordination Status           

Comments                      

Notes:

 Pt is a 73 y/o female admitted for CAD. Pt is post op day 1 for a CABG x3. PT is recommending home 


independent. OT has been ordered and awaiting recommendation. Pt will most likely discharge w/out 

any needs and have outpatient rehab when medically stable. CM available for d/c needs.

 

Date Signed:  11/11/2017 10:45 AM

Electronically Signed By:OLENA Cook

## 2017-11-11 NOTE — SOAPPROG
SOAP Progress Note


Assessment/Plan: 


CABGx3 (LIMA-LAD, seq SVG-PL/PD), AtriClip JOSEFINA, EVH left thigh





CAD s/p CABGx3


- BB, ASA, statin when appropriate 


- AL/FC out


- CT to water seal (?air leak)


- SCDs/heparin SQ for SVT prophylaxis 


- Transfer PCU





Acute blood loss anemia 


- Stable without the need for blood product transfusions 








Subjective: 


Pain well-controlled, denies N/V/SOB/abd pain.


Objective: 





 Vital Signs











Temp Pulse Resp BP Pulse Ox


 


 37.5 C   89   21 H  100/53 L  93 


 


 11/11/17 04:00  11/11/17 06:00  11/11/17 06:00  11/11/17 06:00  11/11/17 06:00








 Laboratory Results





 11/11/17 04:00 





 11/11/17 04:00 





 











 11/10/17 11/11/17 11/12/17





 05:59 05:59 05:59


 


Intake Total  1362 


 


Output Total  2125 


 


Balance  -763 














Physical Exam





- Physical Exam


General Appearance: WD/WN, alert, no apparent distress


EENT: No scleral icterus (R), No scleral icterus (L)


Neck: normal inspection


Respiratory: No respiratory distress


Cardiac/Chest: regular rate, rhythm


Abdomen: non-tender, soft, No distended


Skin: normal color, warm/dry


Extremities: pedal edema


Neuro/Psych: no motor/sensory deficits, alert, normal mood/affect, oriented x 3





ICD10 Worksheet


Patient Problems: 


 Problems











Problem Status Onset


 


S/P CABG x 3 Acute  


 


Coronary arteriosclerosis Chronic  


 


Edema Acute  


 


Hypokalemia Acute  


 


Hyponatremia Acute

## 2017-11-12 LAB
% IMMATURE GRANULYOCYTES: 0.5 % (ref 0–1.1)
ABSOLUTE IMMATURE GRANULOCYTES: 0.08 10^3/UL (ref 0–0.1)
ABSOLUTE NRBC COUNT: 0 10^3/UL (ref 0–0.01)
ADD DIFF?: NO
ADD MORPH?: NO
ADD SCAN?: NO
ANION GAP SERPL CALC-SCNC: 9 MEQ/L (ref 8–16)
ATYPICAL LYMPHOCYTE FLAG: 0 (ref 0–99)
CALCIUM SERPL-MCNC: 7.9 MG/DL (ref 8.5–10.4)
CHLORIDE SERPL-SCNC: 102 MEQ/L (ref 97–110)
CO2 SERPL-SCNC: 21 MEQ/L (ref 22–31)
CREAT SERPL-MCNC: 0.9 MG/DL (ref 0.6–1)
ERYTHROCYTE [DISTWIDTH] IN BLOOD BY AUTOMATED COUNT: 13.5 % (ref 11.5–15.2)
FRAGMENT RBC FLAG: 0 (ref 0–99)
GFR SERPL CREATININE-BSD FRML MDRD: > 60 ML/MIN/{1.73_M2}
GLUCOSE SERPL-MCNC: 115 MG/DL (ref 70–100)
HCT VFR BLD CALC: 23.6 % (ref 38–47)
HGB BLD-MCNC: 8.3 G/DL (ref 12.6–16.3)
LEFT SHIFT FLG: 10 (ref 0–99)
LIPEMIA HEMOLYSIS FLAG: 90 (ref 0–99)
MCH RBC BLDCO QN: 35.8 PG (ref 27.9–34.1)
MCHC RBC AUTO-ENTMCNC: 35.2 G/DL (ref 32.4–36.7)
MCV RBC AUTO: 101.7 FL (ref 81.5–99.8)
NRBC-AUTO%: 0 % (ref 0–0.2)
PLATELET # BLD: 119 10^3/UL (ref 150–400)
PLATELET CLUMPS FLAG: 10 (ref 0–99)
PMV BLD AUTO: 10.1 FL (ref 8.7–11.7)
POTASSIUM SERPL-SCNC: 4.1 MEQ/L (ref 3.5–5.2)
RBC # BLD AUTO: 2.32 10^6/UL (ref 4.18–5.33)
SODIUM SERPL-SCNC: 132 MEQ/L (ref 134–144)

## 2017-11-12 RX ADMIN — DOCUSATE SODIUM AND SENNOSIDES SCH TAB: 50; 8.6 TABLET ORAL at 08:17

## 2017-11-12 RX ADMIN — HEPARIN SODIUM SCH UNIT: 5000 INJECTION, SOLUTION INTRAVENOUS; SUBCUTANEOUS at 06:18

## 2017-11-12 RX ADMIN — HYDROCODONE BITARTRATE AND ACETAMINOPHEN PRN TAB: 5; 325 TABLET ORAL at 04:48

## 2017-11-12 RX ADMIN — DOCUSATE SODIUM AND SENNOSIDES SCH TAB: 50; 8.6 TABLET ORAL at 21:02

## 2017-11-12 RX ADMIN — PANTOPRAZOLE SODIUM SCH MG: 40 TABLET, DELAYED RELEASE ORAL at 08:17

## 2017-11-12 RX ADMIN — HEPARIN SODIUM SCH UNIT: 5000 INJECTION, SOLUTION INTRAVENOUS; SUBCUTANEOUS at 13:14

## 2017-11-12 RX ADMIN — HYDROCODONE BITARTRATE AND ACETAMINOPHEN PRN TAB: 5; 325 TABLET ORAL at 21:02

## 2017-11-12 RX ADMIN — MUPIROCIN SCH APP: 20 OINTMENT TOPICAL at 08:17

## 2017-11-12 RX ADMIN — HEPARIN SODIUM SCH UNIT: 5000 INJECTION, SOLUTION INTRAVENOUS; SUBCUTANEOUS at 21:01

## 2017-11-12 RX ADMIN — ASPIRIN 81 MG SCH MG: 81 TABLET ORAL at 21:01

## 2017-11-12 NOTE — SOAPPROG
SOAP Progress Note


Assessment/Plan: 


POD #2: CABGx3 (LIMA-LAD, seq SVG-PL/PD), AtriClip JOSEFINA, EVH left thigh





CAD s/p CABGx3


- BB, ASA, statin when appropriate 


- CTs and pacing wires likely out this morning 


- SCDs/heparin SQ for SVT prophylaxis 





Acute blood loss anemia 


- Stable without the need for blood product transfusions 








Subjective: 


Pain well-controlled. Denies SOB. Has a swollen area on right upper lip.


Objective: 





 Vital Signs











Temp Pulse Resp BP Pulse Ox


 


 36.9 C   97   14   110/58 L  97 


 


 11/12/17 07:26  11/12/17 07:26  11/12/17 07:26  11/12/17 07:26  11/12/17 07:26








 Laboratory Results





 11/12/17 04:45 





 11/12/17 05:30 





 











 11/11/17 11/12/17 11/13/17





 05:59 05:59 05:59


 


Intake Total 1362 1060 


 


Output Total 2125 495 85


 


Balance -763 565 -85














Physical Exam





- Physical Exam


General Appearance: WD/WN, alert, no apparent distress


EENT: other (swollen right upper lip), No scleral icterus (R), No scleral 

icterus (L)


Neck: normal inspection


Respiratory: No respiratory distress


Cardiac/Chest: regular rate, rhythm


Abdomen: non-tender, soft, No distended


Skin: normal color, warm/dry


Extremities: No pedal edema


Neuro/Psych: no motor/sensory deficits, alert, normal mood/affect, oriented x 3

, No motor weakness, No cognition abnormalities





ICD10 Worksheet


Patient Problems: 


 Problems











Problem Status Onset


 


S/P CABG x 3 Acute  


 


Coronary arteriosclerosis Chronic  


 


Edema Acute  


 


Hypokalemia Acute  


 


Hyponatremia Acute

## 2017-11-13 RX ADMIN — ACETAMINOPHEN PRN MG: 325 TABLET ORAL at 13:03

## 2017-11-13 RX ADMIN — HEPARIN SODIUM SCH UNIT: 5000 INJECTION, SOLUTION INTRAVENOUS; SUBCUTANEOUS at 15:47

## 2017-11-13 RX ADMIN — PANTOPRAZOLE SODIUM SCH MG: 40 TABLET, DELAYED RELEASE ORAL at 09:27

## 2017-11-13 RX ADMIN — VITAMIN D, TAB 1000IU (100/BT) SCH UNITS: 25 TAB at 09:27

## 2017-11-13 RX ADMIN — HEPARIN SODIUM SCH UNIT: 5000 INJECTION, SOLUTION INTRAVENOUS; SUBCUTANEOUS at 20:40

## 2017-11-13 RX ADMIN — ASPIRIN 81 MG SCH MG: 81 TABLET ORAL at 20:45

## 2017-11-13 RX ADMIN — DOCUSATE SODIUM AND SENNOSIDES SCH TAB: 50; 8.6 TABLET ORAL at 20:41

## 2017-11-13 RX ADMIN — ACETAMINOPHEN PRN MG: 325 TABLET ORAL at 20:41

## 2017-11-13 RX ADMIN — HEPARIN SODIUM SCH UNIT: 5000 INJECTION, SOLUTION INTRAVENOUS; SUBCUTANEOUS at 06:24

## 2017-11-13 RX ADMIN — DOCUSATE SODIUM AND SENNOSIDES SCH TAB: 50; 8.6 TABLET ORAL at 09:26

## 2017-11-14 VITALS
TEMPERATURE: 98.3 F | HEART RATE: 104 BPM | RESPIRATION RATE: 14 BRPM | DIASTOLIC BLOOD PRESSURE: 58 MMHG | SYSTOLIC BLOOD PRESSURE: 119 MMHG

## 2017-11-14 VITALS — OXYGEN SATURATION: 86 %

## 2017-11-14 LAB
ANION GAP SERPL CALC-SCNC: 6 MEQ/L (ref 8–16)
CALCIUM SERPL-MCNC: 7.7 MG/DL (ref 8.5–10.4)
CHLORIDE SERPL-SCNC: 102 MEQ/L (ref 97–110)
CO2 SERPL-SCNC: 26 MEQ/L (ref 22–31)
CREAT SERPL-MCNC: 0.6 MG/DL (ref 0.6–1)
GFR SERPL CREATININE-BSD FRML MDRD: > 60 ML/MIN/{1.73_M2}
GLUCOSE SERPL-MCNC: 94 MG/DL (ref 70–100)
POTASSIUM SERPL-SCNC: 3.6 MEQ/L (ref 3.5–5.2)
SODIUM SERPL-SCNC: 134 MEQ/L (ref 134–144)

## 2017-11-14 RX ADMIN — DOCUSATE SODIUM AND SENNOSIDES SCH: 50; 8.6 TABLET ORAL at 10:06

## 2017-11-14 RX ADMIN — VITAMIN D, TAB 1000IU (100/BT) SCH UNITS: 25 TAB at 10:04

## 2017-11-14 RX ADMIN — PANTOPRAZOLE SODIUM SCH MG: 40 TABLET, DELAYED RELEASE ORAL at 10:02

## 2017-11-14 RX ADMIN — HEPARIN SODIUM SCH UNIT: 5000 INJECTION, SOLUTION INTRAVENOUS; SUBCUTANEOUS at 06:19

## 2017-11-14 NOTE — PDHOMEO2F
Home Oxygen Face to Face


Home Orders: 


I certify that a physician or a nurse practitioner or physician's assistant has 

had a face-to-face encounter with this patient on the date of this order due to 

the diagnosis listed, which relates to the primary reason the patient requires 

home oxygen. Alternative treatments have been tried, or considered, and deemed 

ineffective. It is anticipated that supplemental oxygen will result in 

improvement with treatment.





Home oxygen qualifying diagnosis: s/p CABG, hypoxemia, atelectasis, SOB


SpO2 on room air (%): 84


Frequency of home oxygen needed: continuous


Home oxygen liters per minute: 2


Home oxygen delivery device: nasal cannula


Concentrator: Yes


E-tanks for mobility and back up: Yes


If ordering portable O2, is the patient mobile in the home?: Yes


I certify that, based on these findings, the home oxygen is medically necessary 

for this patient for the following length of time.





Length of time home oxygen needed: 1 month

## 2017-11-14 NOTE — ASDISCHSUM
----------------------------------------------

Discharge Information

----------------------------------------------

Plan Status:Home with No Needs                       Medically Cleared to Leave:11/13/2017

Discharge Date:11/14/2017 01:17 PM                   CM D/C Disposition:Home, Routine, Self-Care

ADT D/C Disposition:Home, Routine, Self-Care         Projected Discharge Date:11/14/2017 01:17 PM

Transportation at D/C:Family                         Discharge Delay Reason:

Follow-Up Date:11/14/2017 01:17 PM                   Discharge Slot:

Final Diagnosis:

----------------------------------------------

Placement Information

----------------------------------------------

----------------------------------------------

Patient Contact Information

----------------------------------------------

Contact Name:CANDIDO                             Relationship:

Address:[address] [phone]

                                                     Work [phone]

City:[place]                                         Alternate Phone:

State/Zip Code:[place] [address]                              Email:

----------------------------------------------

Financial Information

----------------------------------------------

Financial Class:[employer]

Primary Plan Desc:[employer] [facility]       Primary Plan Number:[id number]

Secondary Plan Desc:MEDICARE INPATIENT               Secondary Plan Number:[id number]

 

 

----------------------------------------------

Assessment Information

----------------------------------------------

----------------------------------------------

[facility]

----------------------------------------------

Living Arrangements

 

What is your living           Answers:  With Spouse                           

arrangement? Who do you                                                       

live with?                                                                    

Type Of Residence

 

What kind of residence do     Answers:  House                                 

you live in?                                                                  

Discharge Plan Comments

 

Coordination Status           

Comments                      

Notes:

 Pt is a 71 y/o female admitted for CAD. Pt is post op day 1 for a CABG x3. PT is recommending home 


independent. OT has been ordered and awaiting recommendation. Pt will most likely discharge w/out 

any needs and have outpatient rehab when medically stable. CM available for d/c needs.

 

Date Signed:  11/11/2017 10:45 AM

Electronically Signed By:OLENA Cook

 

 

----------------------------------------------

Intervention Information

----------------------------------------------

## 2017-11-14 NOTE — SOAPPROG
SOAP Progress Note


Assessment/Plan: 


POD #4: CABGx3 (LIMA-LAD, seq SVG-PL/PD), AtriClip JOSEFINA, EVH left thigh





CAD s/p CABGx3


- Continue ASA, statin 


- BB deferred d/t hypotension 


- CTs and pacing wires out


- SCDs/heparin SQ for SVT prophylaxis 





Acute blood loss anemia 


- Stable without the need for blood product transfusions 





Disposition


- Home today without services





Subjective: 


Feels well. Ready to go home.


Objective: 





 Vital Signs











Temp Pulse Resp BP Pulse Ox


 


 36.8 C   104 H  14   119/58 L  93 


 


 11/14/17 07:27  11/14/17 07:27  11/14/17 07:27  11/14/17 07:27  11/14/17 07:27








 Laboratory Results





 11/12/17 04:45 





 11/14/17 04:23 





 











 11/13/17 11/14/17 11/15/17





 05:59 05:59 05:59


 


Intake Total 2656 1270 


 


Output Total 535 1600 


 


Balance 2121 -330 














Physical Exam





- Physical Exam


General Appearance: WD/WN, alert, no apparent distress


EENT: No scleral icterus (R), No scleral icterus (L)


Neck: normal inspection


Respiratory: No respiratory distress


Cardiac/Chest: regular rate, rhythm


Abdomen: non-tender, soft, No distended


Skin: normal color, warm/dry


Extremities: pedal edema


Neuro/Psych: no motor/sensory deficits, alert, normal mood/affect, oriented x 3





ICD10 Worksheet


Patient Problems: 


 Problems











Problem Status Onset


 


S/P CABG x 3 Acute  


 


Coronary arteriosclerosis Chronic  


 


Edema Acute  


 


Hypokalemia Acute  


 


Hyponatremia Acute

## 2017-11-20 ENCOUNTER — HOSPITAL ENCOUNTER (OUTPATIENT)
Dept: HOSPITAL 80 - FIMAGING | Age: 72
End: 2017-11-20
Attending: THORACIC SURGERY (CARDIOTHORACIC VASCULAR SURGERY)
Payer: COMMERCIAL

## 2017-11-20 DIAGNOSIS — J98.11: ICD-10-CM

## 2017-11-20 DIAGNOSIS — Z95.1: ICD-10-CM

## 2017-11-20 DIAGNOSIS — J90: Primary | ICD-10-CM

## 2017-11-27 ENCOUNTER — HOSPITAL ENCOUNTER (OUTPATIENT)
Dept: HOSPITAL 80 - BMCIMAGING | Age: 72
End: 2017-11-27
Attending: INTERNAL MEDICINE
Payer: COMMERCIAL

## 2017-11-27 DIAGNOSIS — R07.9: Primary | ICD-10-CM

## 2017-11-27 DIAGNOSIS — J90: ICD-10-CM

## 2017-12-06 ENCOUNTER — HOSPITAL ENCOUNTER (OUTPATIENT)
Dept: HOSPITAL 80 - FIMAGING | Age: 72
End: 2017-12-06
Attending: THORACIC SURGERY (CARDIOTHORACIC VASCULAR SURGERY)
Payer: COMMERCIAL

## 2017-12-06 DIAGNOSIS — Z95.1: ICD-10-CM

## 2017-12-06 DIAGNOSIS — Z09: Primary | ICD-10-CM

## 2018-01-10 ENCOUNTER — HOSPITAL ENCOUNTER (OUTPATIENT)
Dept: HOSPITAL 80 - BMCIMAGING | Age: 73
End: 2018-01-10
Attending: INTERNAL MEDICINE
Payer: COMMERCIAL

## 2018-01-10 DIAGNOSIS — Z12.31: Primary | ICD-10-CM

## 2018-03-20 ENCOUNTER — HOSPITAL ENCOUNTER (OUTPATIENT)
Dept: HOSPITAL 80 - BMCIMAGING | Age: 73
End: 2018-03-20
Attending: FAMILY MEDICINE
Payer: COMMERCIAL

## 2018-03-20 DIAGNOSIS — S09.93XA: Primary | ICD-10-CM

## 2018-11-03 ENCOUNTER — HOSPITAL ENCOUNTER (OUTPATIENT)
Dept: HOSPITAL 80 - FIMAGING | Age: 73
End: 2018-11-03
Attending: ORTHOPAEDIC SURGERY
Payer: COMMERCIAL

## 2018-11-03 DIAGNOSIS — Z01.818: Primary | ICD-10-CM

## 2018-11-03 DIAGNOSIS — Z96.643: ICD-10-CM

## 2018-11-03 DIAGNOSIS — M17.11: ICD-10-CM

## 2018-12-17 ENCOUNTER — HOSPITAL ENCOUNTER (OUTPATIENT)
Dept: HOSPITAL 80 - F3N | Age: 73
Setting detail: OBSERVATION
LOS: 1 days | Discharge: HOME HEALTH | End: 2018-12-18
Attending: ORTHOPAEDIC SURGERY | Admitting: ORTHOPAEDIC SURGERY
Payer: COMMERCIAL

## 2018-12-17 DIAGNOSIS — M17.11: Primary | ICD-10-CM

## 2018-12-17 DIAGNOSIS — Z95.5: ICD-10-CM

## 2018-12-17 DIAGNOSIS — E83.119: ICD-10-CM

## 2018-12-17 DIAGNOSIS — Z95.1: ICD-10-CM

## 2018-12-17 DIAGNOSIS — Z96.643: ICD-10-CM

## 2018-12-17 PROCEDURE — 97165 OT EVAL LOW COMPLEX 30 MIN: CPT

## 2018-12-17 PROCEDURE — 73560 X-RAY EXAM OF KNEE 1 OR 2: CPT

## 2018-12-17 PROCEDURE — 97116 GAIT TRAINING THERAPY: CPT

## 2018-12-17 PROCEDURE — 88311 DECALCIFY TISSUE: CPT

## 2018-12-17 PROCEDURE — 97161 PT EVAL LOW COMPLEX 20 MIN: CPT

## 2018-12-17 PROCEDURE — 27447 TOTAL KNEE ARTHROPLASTY: CPT

## 2018-12-17 RX ADMIN — TRANEXAMIC ACID SCH MG: 650 TABLET ORAL at 20:18

## 2018-12-17 RX ADMIN — ACETAMINOPHEN SCH MG: 325 TABLET ORAL at 23:08

## 2018-12-17 RX ADMIN — Medication PRN MCG: at 13:00

## 2018-12-17 RX ADMIN — Medication PRN MCG: at 12:50

## 2018-12-17 RX ADMIN — DOCUSATE SODIUM AND SENNOSIDES SCH TAB: 50; 8.6 TABLET ORAL at 20:17

## 2018-12-17 RX ADMIN — HYDROMORPHONE HYDROCHLORIDE PRN MG: 2 INJECTION INTRAMUSCULAR; INTRAVENOUS; SUBCUTANEOUS at 13:10

## 2018-12-17 RX ADMIN — HYDROMORPHONE HYDROCHLORIDE PRN MG: 2 INJECTION INTRAMUSCULAR; INTRAVENOUS; SUBCUTANEOUS at 12:50

## 2018-12-17 RX ADMIN — Medication PRN MCG: at 13:10

## 2018-12-17 RX ADMIN — FAMOTIDINE SCH MG: 20 TABLET, FILM COATED ORAL at 20:17

## 2018-12-17 RX ADMIN — ACETAMINOPHEN SCH MG: 325 TABLET ORAL at 17:56

## 2018-12-17 RX ADMIN — CYCLOBENZAPRINE HYDROCHLORIDE PRN MG: 10 TABLET, FILM COATED ORAL at 23:09

## 2018-12-17 RX ADMIN — ASPIRIN SCH MG: 325 TABLET, FILM COATED ORAL at 20:17

## 2018-12-17 RX ADMIN — TRANEXAMIC ACID SCH: 650 TABLET ORAL at 15:19

## 2018-12-17 RX ADMIN — HYDROMORPHONE HYDROCHLORIDE PRN MG: 2 INJECTION INTRAMUSCULAR; INTRAVENOUS; SUBCUTANEOUS at 13:00

## 2018-12-17 NOTE — PDANEPAE
ANE History of Present Illness





Total Knee arthroplasty, R








ANE Past Medical History





- Cardiovascular History


Hx Hypertension: Yes


Hx Arrhythmias: No


Hx Chest Pain: No


Hx Coronary Artery / Peripheral Vascular Disease: Yes


Hx CHF / Valvular Disease: No


Hx Palpitations: No


Cardiovascular History Comment: HPL,.  STENTS PLACED X9 LAST 10/25/2017.  

CABGX3 on 11/17





- Pulmonary History


Hx COPD: No


Hx Asthma/Reactive Airway Disease: No


Hx Recent Upper Respiratory Infection: No


Hx Oxygen in Use at Home: No


Hx Sleep Apnea: No


Sleep Apnea Screening Result - Last Documented: Negative





- Neurologic History


Hx Cerebrovascular Accident: No


Hx Seizures: No


Hx Dementia: No





- Endocrine History


Hx Diabetes: No


Hypothyroid: No


Hyperthyroid: No


Obesity: no





- Renal History


Hx Renal Disorders: Yes


Renal History Comment: OVERACTIVE BLADDER





- Liver History


Hx Hepatic Disorders: No





- Neurological & Psychiatric Hx


Hx Neurological and Psychiatric Disorders: No





- Cancer History


Hx Cancer: No





- Congenital Disorder History


Hx Congenital Disorders: No





- GI History


GERD: no


Hx Gastrointestinal Disorders: No





- Other Health History


Other Health History: OSTEOARTHRITIS BAD KNEE'S.  HEREDITARY HEMOCHROMATOSIS.  

BRUISES EASILY.  HX of SCOLIOSIS





- Chronic Pain History


Chronic Pain: Yes (RT KNEE)





- Surgical History


Prior Surgeries: GABGX3 11/2017.  HEART STENT 10/25/2017.  JUWAN TOTAL HIP.  

SHLDR RTC.  TONSILLECTOMY





ANE Review of Systems


Review of Systems: 








- Exercise capacity


METS (RN): 4 METS





ANE Patient History





- Allergies


Allergies/Adverse Reactions: 








amoxicillin [Amoxicillin] Allergy (Severe, Verified 08/21/17 11:27)


 Rash


ampicillin [Ampicillin] Allergy (Severe, Verified 08/21/17 11:27)


 Rash


Penicillins Allergy (Severe, Verified 11/09/17 17:47)


 Rash








- Home Medications


Home Medications: 








Aspirin [Aspirin 81mg (*)] 81 mg PO HS 08/04/14 [Last Taken 12/13/18]


Atorvastatin Calcium [Lipitor 40 mg (*)] 40 mg PO HS 06/22/16 [Last Taken 1 Day 

Ago ~12/16/18]


Cholecalciferol Vit D3 [Vitamin D3] 400 units PO DAILY 11/09/17 [Last Taken 12/ 12/18]


Herbals/Supplements -Info Only 1 ea PO DAILY 11/09/17 [Last Taken 12/13/18]


Amlodipine Besylate/Valsartan [Amlodipine-Valsartan  mg] 1 each PO DAILY 

11/30/18 [Last Taken 1 Day Ago ~12/16/18]


Metoprolol Succinate Xr [Toprol Xl 50 mg (*)] 50 mg PO HS 11/30/18 [Last Taken 

1 Day Ago ~12/16/18]


Amitriptyline HCl [Elavil 10 mg (*)] 10 mg PO HS 12/17/18 [Last Taken Unknown]








- NPO status


NPO Since - Liquids (Date): 12/17/18


NPO Since - Liquids (Time): 07:30


NPO Since - Solids (Date): 12/16/18


NPO Since - Solids (Time): 19:30





- Smoking Hx


Smoking Status: Former smoker





ANE Labs/Vital Signs





- Vital Signs


Blood Pressure: 132/69


Heart Rate: 62


Respiratory Rate: 16


O2 Sat (%): 98


Height: 158.75 cm


Weight: 63.503 kg





ANE Physical Exam





- Airway


Neck exam: FROM


Mallampati Score: Class 3


Mouth exam: normal dental/mouth exam





- Pulmonary


Pulmonary: clear to auscultation





- Cardiovascular


Cardiovascular: regular rate and rhythym





- ASA Status


ASA Status: II





ANE Anesthesia Plan


Anesthesia Plan: spinal (Possible need for GA discussed. Questions answered.)


Regional Anesthesia: adductor canal FNB

## 2018-12-17 NOTE — POSTOPPROG
Post Op Note


Date of Operation: 12/17/18


Surgeon: Yadiel Mancilla


Assistant: laci


Anesthesiologist: olivia


Anesthesia: GET(General Endotracheal)


Pre-op Diagnosis: righ knee djd


Post-op Diagnosis: same


Indication: same


Procedure: right tka


Inf/Abcess present in the surg proc area at time of surgery?: No


Depth: Deep Incisional (Fascial)


EBL: 100-500

## 2018-12-17 NOTE — PDIAF
- Diagnosis


Diagnosis: right knee djd


Code Status: Full Code





- Medication Management


Discharge Medications: electronically signed and located in the Home Medication 

List.





- Orders


Services needed: Home Care, Physical Therapy


Home Care Face to Face: I certify that this patient was under my care and that 

I had the required face-to-face encounter meeting the encounter requirements on 

the discharge day.  My findings support the fact that the patient is homebound 

as defined in


Home Care Face to Face Continued: CMS Chapter 7 Medicare Benefits Manual 30.1.1

, The condition of the patient is such that there exists a normal inability to 

leave home and consequently, leaving home would require a considerable and 

taxing effort.


Isolation Type: None


Diet Recommendation: no restrictions on diet


Diet Texture: Regular Texture Diet


Additional Instructions: 


TOTAL JOINT ARTHROPLASTY DISCHARGE INSTRUCTIONS 





1. Your surgeon follows the [facility] protocol for reducing your 

risk of DVT (blood clots) following surgery. Medication will be ordered to 

prevent blood clots. A sudden increase in calf pain and/or swelling could 

indicate a blood clot in your leg. If this occurs, please call your surgeon or 

his/her assistant. An ultrasound of the leg may be necessary to diagnose a 

blood clot. If you have conditions that make you a higher risk for blood clots, 

your surgeon may use more aggressive ways to prevent them. Notify your surgeon 

if you think you are a high risk for blood clots. 





2. Wear your white surgical stockings (ALINA hose) for 2 weeks. This decreases 

your swelling and may help prevent blood clots. It is ok to remove ALINA hose at 

night time to give your legs a break. 





3. Swelling and bruising in the surgical leg is common. If you feel that it is 

excessive, please notify your surgeon. 





4. Elevate your surgical leg with the ankle above the hip several times every 

day. Please keep the leg straight when you elevate by putting pillows under 

your foot. Do not put pillows under your knee. This will make being able to 

fully straighten more difficult. This is uncomfortable, but try to do it as 

much as possible. 





5. For total knee replacements use compressive wrap on your knee for 3-5 days 

after surgery, then you can discontinue it. 





6. Use a walker or crutches for 1-2 weeks. Progress your weight-bearing as 

tolerated. You may start to use a cane when you feel stable and safe. 





7. You will receive physical therapy instructions in the hospital. Continue 

those exercises at home. There are additional exercises in the total joint 

booklet you were given before surgery. Outpatient physical therapy will begin 7-

10 days after surgery. Please schedule this in advance. 





8. Use ice on your knee at least 3-5 times every day for 30 minutes. This helps 

reduce pain and swelling. Also use it at night before falling asleep. 





9. Leave your surgical dressing in place for 2 weeks. Your dressing is water 

resistant, but not waterproof. Cover it with Saran Wrap or Press-n-Seal before 

showering. You may shower as soon as you feel safe entering a shower. If you 

notice bleeding from your incision 2 or 3 days after surgery, please notify 

your surgeon. 





10. Due to narcotics, decreased activity and altered diet, most patients 

experience constipation after surgery. Use over-the-counter stool softeners 

while you are on narcotics. 





11. You may drive a car when you are comfortable bearing weight, have good 

muscular control of your leg and are off narcotics. This usually occurs 2-4 

weeks after surgery, depending on which leg was operated on. 





12. If there are questions not addressed here, please refer the [facility] book given 

for more information. If you still have questions, please contact your surgeon

s office. 





13. If you have a life-threatening emergency, please call 911 and go to the 

emergency room immediately. For non-life threatening emergencies, please call 

your physicians office for advice before going to the emergency room.








- Follow Up Care


Current Providers and Referrals: 


Cecilia Suarez MD [Primary Care Provider] - 


Yadiel Mancilla MD [Medical Doctor] -

## 2018-12-18 VITALS — DIASTOLIC BLOOD PRESSURE: 62 MMHG | SYSTOLIC BLOOD PRESSURE: 124 MMHG

## 2018-12-18 RX ADMIN — TRANEXAMIC ACID SCH MG: 650 TABLET ORAL at 05:23

## 2018-12-18 RX ADMIN — DOCUSATE SODIUM AND SENNOSIDES SCH TAB: 50; 8.6 TABLET ORAL at 08:20

## 2018-12-18 RX ADMIN — FAMOTIDINE SCH MG: 20 TABLET, FILM COATED ORAL at 08:20

## 2018-12-18 RX ADMIN — CYCLOBENZAPRINE HYDROCHLORIDE PRN MG: 10 TABLET, FILM COATED ORAL at 10:38

## 2018-12-18 RX ADMIN — ACETAMINOPHEN SCH: 325 TABLET ORAL at 12:39

## 2018-12-18 RX ADMIN — ASPIRIN SCH MG: 325 TABLET, FILM COATED ORAL at 08:20

## 2018-12-18 RX ADMIN — ACETAMINOPHEN SCH MG: 325 TABLET ORAL at 05:22

## 2018-12-18 NOTE — SOAPPROG
SOAP Progress Note


Assessment/Plan: 


Assessment:








s/p tka

















Plan:


stable


d.c home when cleared by pt


dvt precautions reviewed


f/u at two weeks


seek attn for complication





12/18/18 07:35





Subjective: 





no complaints


slept well


no cp or sob





Objective: 





 Vital Signs











Temp Pulse Resp BP Pulse Ox


 


 36.6 C   56 L  14   124/62 H  95 


 


 12/18/18 07:19  12/18/18 07:19  12/18/18 07:19  12/18/18 07:19  12/18/18 07:19








 Laboratory Results





 12/18/18 05:04 





 12/17/18 14:33 





 











 12/17/18 12/18/18 12/19/18





 05:59 05:59 05:59


 


Intake Total  1900 


 


Output Total  350 


 


Balance  1550 








dressing intact


intact pf,df,ehl


toes warm and pink


neg homans raza


xrays anatomic, no fx or lucency








ICD10 Worksheet


Patient Problems: 


 Problems











Problem Status Onset


 


Edema Acute  


 


Hypokalemia Acute  


 


Hyponatremia Acute  


 


S/P CABG x 3 Acute  


 


Coronary arteriosclerosis Chronic

## 2018-12-18 NOTE — PDIAF
- Diagnosis


Diagnosis: right knee djd


Code Status: Full Code





- Medication Management


Discharge Medications: electronically signed and located in the Home Medication 

List.





- Orders


Services needed: Home Care, Physical Therapy


Home Care Face to Face: I certify that this patient was under my care and that 

I had the required face-to-face encounter meeting the encounter requirements on 

the discharge day.  My findings support the fact that the patient is homebound 

as defined in


Home Care Face to Face Continued: CMS Chapter 7 Medicare Benefits Manual 30.1.1

, The condition of the patient is such that there exists a normal inability to 

leave home and consequently, leaving home would require a considerable and 

taxing effort.


Isolation Type: None


Diet Recommendation: no restrictions on diet


Diet Texture: Regular Texture Diet


Additional Instructions: 


TOTAL JOINT ARTHROPLASTY DISCHARGE INSTRUCTIONS 





1. Your surgeon follows the [facility] protocol for reducing your 

risk of DVT (blood clots) following surgery. Medication will be ordered to 

prevent blood clots. A sudden increase in calf pain and/or swelling could 

indicate a blood clot in your leg. If this occurs, please call your surgeon or 

his/her assistant. An ultrasound of the leg may be necessary to diagnose a 

blood clot. If you have conditions that make you a higher risk for blood clots, 

your surgeon may use more aggressive ways to prevent them. Notify your surgeon 

if you think you are a high risk for blood clots. 





2. Wear your white surgical stockings (ALINA hose) for 2 weeks. This decreases 

your swelling and may help prevent blood clots. It is ok to remove ALINA hose at 

night time to give your legs a break. 





3. Swelling and bruising in the surgical leg is common. If you feel that it is 

excessive, please notify your surgeon. 





4. Elevate your surgical leg with the ankle above the hip several times every 

day. Please keep the leg straight when you elevate by putting pillows under 

your foot. Do not put pillows under your knee. This will make being able to 

fully straighten more difficult. This is uncomfortable, but try to do it as 

much as possible. 





5. For total knee replacements use compressive wrap on your knee for 3-5 days 

after surgery, then you can discontinue it. 





6. Use a walker or crutches for 1-2 weeks. Progress your weight-bearing as 

tolerated. You may start to use a cane when you feel stable and safe. 





7. You will receive physical therapy instructions in the hospital. Continue 

those exercises at home. There are additional exercises in the total joint 

booklet you were given before surgery. Outpatient physical therapy will begin 7-

10 days after surgery. Please schedule this in advance. 





8. Use ice on your knee at least 3-5 times every day for 30 minutes. This helps 

reduce pain and swelling. Also use it at night before falling asleep. 





9. Leave your surgical dressing in place for 2 weeks. Your dressing is water 

resistant, but not waterproof. Cover it with Saran Wrap or Press-n-Seal before 

showering. You may shower as soon as you feel safe entering a shower. If you 

notice bleeding from your incision 2 or 3 days after surgery, please notify 

your surgeon. 





10. Due to narcotics, decreased activity and altered diet, most patients 

experience constipation after surgery. Use over-the-counter stool softeners 

while you are on narcotics. 





11. You may drive a car when you are comfortable bearing weight, have good 

muscular control of your leg and are off narcotics. This usually occurs 2-4 

weeks after surgery, depending on which leg was operated on. 





12. If there are questions not addressed here, please refer the [facility] book given 

for more information. If you still have questions, please contact your surgeon

s office. 





13. If you have a life-threatening emergency, please call 911 and go to the 

emergency room immediately. For non-life threatening emergencies, please call 

your physicians office for advice before going to the emergency room.








- Follow Up Care


Current Providers and Referrals: 


Cecilia Suarez MD [Primary Care Provider] - 


Yadiel Mancilla MD [Medical Doctor] -

## 2018-12-18 NOTE — ASMTDCNOTE
Case Management Discharge

 

Discharge Order Complete?     Answers:  Yes                                   

Patient to Obtain             Answers:  via Family                            

Medications                                                                   

Transportation Arranged       Answers:  Family/Friends                        

Faxed Final Orders            Answers:  Yes                                   

Agency/Facility Transfer      Answers:  Yes                                   

Report Printed & Faxed to                                                     

Receiving Agency                                                              

Discharge Comments            

Notes:

CM met with pt. Pt agreeable to [facility] home care. No other CM needs identified. 

 

 

Date Signed:  12/18/2018 12:20 PM

Electronically Signed By:OLENA Apodaca

## 2018-12-18 NOTE — ASDISCHSUM
----------------------------------------------

Discharge Information

----------------------------------------------

Plan Status:Home with Home Health                    Medically Cleared to Leave:

Discharge Date:                                       D/C Disposition:Home Health Service

ADT D/C Disposition:Home Health Service              Projected Discharge Date:12/18/2018 11:00 AM

Transportation at D/C:                               Discharge Delay Reason:

Follow-Up Date:12/18/2018 11:00 AM                   Discharge Slot:

Final Diagnosis:

----------------------------------------------

Placement Information

----------------------------------------------

Referral Type:*Home Health Care Services             Referral ID:[id number]

Provider Name:[facility]

Address 1:[address]                  Phone [phone]

Address 2:                                           Fax [phone]

City:[place]                                         Selection Factors:

State:[place]

 

----------------------------------------------

Patient Contact Information

----------------------------------------------

Contact Name:SOTEROSUNDAY                             Relationship:

Address:[address]                             Home [phone]

                                                     Work [phone]

City:[place]                                         Alternate Phone:

State/Zip Code:[place] [address]                              Email:

----------------------------------------------

Financial Information

----------------------------------------------

Financial Class:Medicare

Primary Plan Desc:MEDICARE INPATIENT                 Primary Plan Number:[id number]

Secondary Plan Desc:LUIZ VICTORIANO OROZCO                  Secondary Plan Number:[id number]

 

 

----------------------------------------------

Assessment Information

----------------------------------------------

----------------------------------------------

LACE

----------------------------------------------

LACE

 

Length of stay for            Answers:  2 days                                

current admission                                                             

Acuity / Level of             Answers:  No                                    

Care: Did the patient                                                         

have an inpatient                                                             

admission?                                                                    

Comorbidities - select        Answers:  Coronary Artery Disease               

all that apply                                                                

                                        Opioid dependence                     

                                        / Chronic pain                        

                                        Other                         Notes:  HTN

# of Emergency department     Answers:  0                                     

visits in the last 6                                                          

months                                                                        

Score: 9

 

Date Signed:  12/18/2018 12:21 PM

Electronically Signed By:OLENA Apodaca

 

 

----------------------------------------------

Case Management Discharge Plan Note

----------------------------------------------

Case Management Discharge

 

Discharge Order Complete?     Answers:  Yes                                   

Patient to Obtain             Answers:  via Family                            

Medications                                                                   

Transportation Arranged       Answers:  Family/Friends                        

Faxed Final Orders            Answers:  Yes                                   

Agency/Facility Transfer      Answers:  Yes                                   

Report Printed & Faxed to                                                     

Receiving Agency                                                              

Discharge Comments            

Notes:

CM met with pt. Pt agreeable to [facility] home care. No other CM needs identified. 

 

 

Date Signed:  12/18/2018 12:20 PM

Electronically Signed By:OLENA Apodaca

 

 

----------------------------------------------

Intervention Information

----------------------------------------------

## 2018-12-18 NOTE — ASMTLACE
LACE

 

Length of stay for            Answers:  2 days                                

current admission                                                             

Acuity / Level of             Answers:  No                                    

Care: Did the patient                                                         

have an inpatient                                                             

admission?                                                                    

Comorbidities - select        Answers:  Coronary Artery Disease               

all that apply                                                                

                                        Opioid dependence                     

                                        / Chronic pain                        

                                        Other                         Notes:  HTN

# of Emergency department     Answers:  0                                     

visits in the last 6                                                          

months                                                                        

Score: 9

 

Date Signed:  12/18/2018 12:21 PM

Electronically Signed By:OLENA Apodaca

## 2018-12-20 NOTE — GDS
ADMISSION DIAGNOSIS:  Right knee degenerative joint disease.



DISCHARGE DIAGNOSIS:  Right knee degenerative joint disease.



PROCEDURE:  Right total knee arthroplasty.



HISTORY OF PRESENT ILLNESS:  The patient is a 73-year-old woman who has end-stage [facility].  Clinical and radiographic features are consistent with this.  She presents for elective to
mauro knee replacement.



HOSPITAL COURSE:  The patient was admitted to the hospital floor after uncomplicated total knee arthr
oplasty.  She tolerated the procedure well.  Postoperatively, she had no complications.  At the time 
of discharge, she is tolerating an oral diet.  Pain is well controlled on oral medicines.  She is voi
ding without difficulty.  Dressing is clean, dry, and intact.  She has negative Homans bilateral lowe
r extremities.  X-rays are anatomic with no fracture, subluxation, or step-off.



DISPOSITION:  To home.



DISCHARGE INSTRUCTIONS/FOLLOWUP:  

1.  Activity - She is weightbearing as tolerated.  Range of motion as tolerated. 

2.  Keep the dressing clean, dry, and intact.  If it becomes saturated, then daily dressing changes.

3.  Followup in 2 weeks.





Job #:  [id number]

## 2018-12-20 NOTE — GOP
DATE OF OPERATION:  12/17/2018



SURGEON:  Yadiel Mancilla MD



ASSISTANT:  Latonia Lazo RN



PREOPERATIVE DIAGNOSIS:  Right knee degenerative joint disease.



POSTOPERATIVE DIAGNOSIS:  Right knee degenerative joint disease.



PROCEDURE PERFORMED:  Right total knee arthroplasty.



FINDINGS:  



SPECIMENS:  To Pathology, the bony cuts.



INDICATIONS:  The patient is a 73-year-old woman with end-stage arthritis to her right knee. Clinical
 and radiographic features are consistent with this. She has failed all attempts at conservative nohelia
gement. I have, therefore, recommended total knee replacement. I have outlined the surgical procedure
, risks, benefits, and alternatives at length with her, she wished to proceed.



DESCRIPTION OF PROCEDURE:  The patient was identified in the preanesthesia area. The right knee clear
ly demarcated as the operative site with indelible marker. She was given 2 g of Ancef intravenously i
n route to the operative suite. In the OR,a spinal anesthetic placed. Attention was turned to the rig
ht knee, which was sterilely prepped and draped in usual fashion. A tourniquet was applied to the upp
er thigh. Appropriate time-out procedure was carried out. The limb was sterilely prepped and draped. 
The limb was exsanguinated with an Esmarch bandage. Tourniquet inflated to 275 mmHg. 



A standard anterior midline incision was made. Thick subcutaneous flaps were elevated, followed by me
dial parapatellar arthrotomy. Subperiosteal elevation was carried out to the mid coronal plane. Retra
ctors were placed. There was tricompartmental arthritis. Decision was made to proceed with total knee
 replacement. 



Two pins were then placed from medial to lateral across the distal femur. The femoral reference array
 affixed. The femoral and tibial checkpoints were placed, and a separate percutaneous incision of the
 mid tibia. The 2 pins were placed and the tibial reference array affixed. The bony landmarks were al
l entered into the MAKOplasty robot in standard fashion. Using soft tissue releases and adjustment of
 the components with the MAKOplasty software, the components were balanced throughout the flexion-ext
ension arc. The resections were made for the size 4 femur, size 4 tibia. Trial reduction revealed a 4
 x 11 mm polyethylene to allow full extension, flexion to 120 degrees without instability through the
 flexion-extension arc. 



The trial components were withdrawn. In a sequential fashion, the tibial and femoral components were 
press-fit into position. A 4 x 11 mm polyethylene spacer was placed and confirmed to be fully seated.
 The patella was then everted, cut in a freehand cutting technique. Drill holes were made for a size 
32 mm patella. Press-fit 32 mm patella was placed. The knee was taken through flexion-extension, and 
was stable with varus valgus stress. The kneecap tracked centrally through the trochlear groove. 



The wound was copiously irrigated. The joint was injected with a joint cocktail of ropivacaine, morph
ine, Toradol, and epinephrine. The medial parapatellar arthrotomy closed in 20 degrees of flexion wit
h a #1 Ethibond suture. The capsule instilled with a platelet-rich plasma solution. Subcutaneous tiss
ue was closed using 2-0 Monocryl and the skin was stapled. Sterile compressive dressing was applied. 
The patient was awakened, extubated, and taken to recovery in good and stable condition.



TOTAL TOURNIQUET TIME:  50 minutes.



COMPLICATIONS:  None.



IMPLANTS:  Jaylen Triathlon posterior stabilized femoral component size 4, X3 tibial bearing insert 
4 x 11 mm, Triathlon tibial component size 4, an asymmetric patella 32 mm.



DISPOSITION:  To the recovery room, then floor. She is weightbearing, range of motion as tolerated. W
ill follow standard recovery.





Job #:  [id number]

## 2019-01-30 ENCOUNTER — HOSPITAL ENCOUNTER (OUTPATIENT)
Dept: HOSPITAL 80 - BMCIMAGING | Age: 74
End: 2019-01-30
Attending: INTERNAL MEDICINE
Payer: COMMERCIAL

## 2019-01-30 DIAGNOSIS — Z12.31: Primary | ICD-10-CM

## 2019-01-31 ENCOUNTER — HOSPITAL ENCOUNTER (OUTPATIENT)
Dept: HOSPITAL 80 - BMCIMAGING | Age: 74
End: 2019-01-31
Attending: PHYSICIAN ASSISTANT
Payer: COMMERCIAL

## 2019-01-31 DIAGNOSIS — Z47.1: Primary | ICD-10-CM

## 2019-01-31 DIAGNOSIS — M25.461: ICD-10-CM

## 2019-01-31 DIAGNOSIS — Z96.651: ICD-10-CM

## 2019-03-13 ENCOUNTER — HOSPITAL ENCOUNTER (OUTPATIENT)
Dept: HOSPITAL 80 - BMCIMAGING | Age: 74
End: 2019-03-13
Attending: ORTHOPAEDIC SURGERY
Payer: COMMERCIAL

## 2019-03-13 DIAGNOSIS — Z96.651: ICD-10-CM

## 2019-03-13 DIAGNOSIS — Z47.1: Primary | ICD-10-CM

## 2019-04-10 ENCOUNTER — HOSPITAL ENCOUNTER (OUTPATIENT)
Dept: HOSPITAL 80 - BMCIMAGING | Age: 74
End: 2019-04-10
Attending: INTERNAL MEDICINE
Payer: COMMERCIAL

## 2019-04-10 DIAGNOSIS — Z78.0: ICD-10-CM

## 2019-04-10 DIAGNOSIS — Z13.820: Primary | ICD-10-CM

## 2019-06-19 ENCOUNTER — HOSPITAL ENCOUNTER (OUTPATIENT)
Dept: HOSPITAL 80 - BMCIMAGING | Age: 74
End: 2019-06-19
Payer: COMMERCIAL

## 2019-06-24 ENCOUNTER — HOSPITAL ENCOUNTER (OUTPATIENT)
Dept: HOSPITAL 80 - F2W | Age: 74
Setting detail: OBSERVATION
LOS: 1 days | Discharge: HOME | End: 2019-06-25
Payer: COMMERCIAL

## 2023-06-27 NOTE — PDDCSUM
Discharge Summary


Discharge Summary: 


ADMISSION DATE: 


11/10/17





DISCHARGE DATE:  


11/14/17





ADMISSION DX:


1. Coronary atherosclerotic disease 





DISCHARGE DX: 


1. Coronary atherosclerotic disease 


2. Acute blood loss anemia 


 


PROCEDURES


11/10/17, Jorge Welch:


1. CABGx3 (LIMA-LAD, seq SVG PL/PDA), AtriClip JOSEFINA, EVH left thigh 





HOSPITAL COURSE BY PROBLEM LIST 


1. Coronary atherosclerotic disease - s/p CABGx3 with stable postoperative 

course. Beta-blocker deferred to low blood pressure. Aspirin and statin 

prescribed. 


2. Acute blood loss anemia - stable without the need for blood product 

transfusions. 





CONDITION


Good





DISPOSITION


Home





ACTIVITY


Pt was instructed on activity limitations and which problems to call [facility] with. Please see Discharge Plan in chart for specifics. 





DISCHARGE MEDICATIONS


Continue:


1. Aspirin [Aspirin 81mg (*)] 81 mg PO HS 


2. Atorvastatin Calcium [Lipitor 40 mg (*)] 40 mg PO HS 


3. Cholecalciferol Vit D3 [Vitamin D3] 400 units PO DAILY 


4. Herbals/Supplements -Info Only 1 ea PO DAILY 


New:


1. Acetaminophen [Tylenol 325mg (*)] 325 - 650 mg PO Q4HRS PRN 


2. Furosemide [Lasix 40 MG (*)] 40 mg PO DAILY 


3. Potassium Cl [Klor-Con 20 meq (*)] 20 meq PO DAILY 


4. traMADol [Ultram 50 mg (*)] 50 - 100 mg PO Q4HRS PRN


Discontinue


1. Troprol XL 50 mg daily 


2. Amlodipine/Valsartan  mg daily 





PENDING STUDIES/LABS


1. CXR prior to surgical follow-up 





F/U APPOINTMENTS


1. Jorge Welch - 11/21/17, 9:00 AM [facility]